# Patient Record
Sex: MALE | Race: WHITE | NOT HISPANIC OR LATINO | URBAN - METROPOLITAN AREA
[De-identification: names, ages, dates, MRNs, and addresses within clinical notes are randomized per-mention and may not be internally consistent; named-entity substitution may affect disease eponyms.]

---

## 2019-02-14 PROBLEM — 1755008 OLD MYOCARDIAL INFARCTION: Status: ACTIVE | Noted: 2019-02-14

## 2019-02-14 PROBLEM — 449681000124101 LONG-TERM CURRENT USE OF ANTIPLATELET DRUG: Status: ACTIVE | Noted: 2019-02-14

## 2022-04-30 ENCOUNTER — TELEPHONE ENCOUNTER (OUTPATIENT)
Dept: URBAN - METROPOLITAN AREA CLINIC 121 | Facility: CLINIC | Age: 72
End: 2022-04-30

## 2022-04-30 RX ORDER — METHOTREXATE SODIUM 1 G/1
INJECTION, POWDER, LYOPHILIZED, FOR SOLUTION INTRA-ARTERIAL; INTRAMUSCULAR; INTRATHECAL; INTRAVENOUS
OUTPATIENT
Start: 2011-04-29

## 2022-04-30 RX ORDER — ALBUTEROL SULFATE 90 UG/1
AEROSOL, METERED RESPIRATORY (INHALATION)
OUTPATIENT
Start: 2016-10-06 | End: 2019-02-14

## 2022-04-30 RX ORDER — RISEDRONATE SODIUM 129; 21 MG/1; MG/1
TABLET, FILM COATED ORAL
OUTPATIENT
Start: 2009-10-02

## 2022-04-30 RX ORDER — MESALAMINE 400 MG/1
2 PO TID CAPSULE, DELAYED RELEASE ORAL
OUTPATIENT
Start: 2006-03-15 | End: 2011-02-23

## 2022-04-30 RX ORDER — MESALAMINE 1.2 G/1
4 TABLETS PO QAM WITH FOOD TABLET, DELAYED RELEASE ORAL
OUTPATIENT
Start: 2017-05-01 | End: 2019-02-14

## 2022-04-30 RX ORDER — AZATHIOPRINE 50 MG/1
1 PO TID TABLET ORAL
OUTPATIENT
Start: 2007-03-20 | End: 2007-03-20

## 2022-04-30 RX ORDER — RISEDRONATE SODIUM 129; 21 MG/1; MG/1
TABLET, FILM COATED ORAL
OUTPATIENT
Start: 2009-10-02 | End: 2015-07-02

## 2022-04-30 RX ORDER — ALENDRONATE SODIUM AND CHOLECALCIFEROL 70; 2800 MG/1; [IU]/1
TABLET ORAL
OUTPATIENT
Start: 2007-03-20

## 2022-04-30 RX ORDER — MESALAMINE 1.2 G/1
4 TABLETS PO QD TABLET, DELAYED RELEASE ORAL
OUTPATIENT
Start: 2014-03-17 | End: 2014-12-09

## 2022-04-30 RX ORDER — MESALAMINE 1.2 G/1
4TABS QD TABLET, DELAYED RELEASE ORAL
OUTPATIENT
Start: 2011-06-28 | End: 2014-12-09

## 2022-04-30 RX ORDER — AZATHIOPRINE 50 MG/1
1 PO TID TABLET ORAL
OUTPATIENT
Start: 2006-03-15

## 2022-04-30 RX ORDER — MESALAMINE 400 MG/1
2 PO TID CAPSULE, DELAYED RELEASE ORAL
OUTPATIENT
Start: 2006-03-15

## 2022-04-30 RX ORDER — MESALAMINE 1.2 G/1
4 TABLETS PO QAM WITH FOOD TABLET, DELAYED RELEASE ORAL
OUTPATIENT
Start: 2017-05-01

## 2022-04-30 RX ORDER — MESALAMINE 1.2 G/1
TAKE 4 TABS PO QD TABLET, DELAYED RELEASE ORAL
OUTPATIENT
Start: 2013-07-03 | End: 2014-12-09

## 2022-04-30 RX ORDER — AZATHIOPRINE 50 MG/1
1 PO TID TABLET ORAL
OUTPATIENT
Start: 2006-03-15 | End: 2011-02-23

## 2022-04-30 RX ORDER — LATANOPROST/PF 0.005 %
DROPS OPHTHALMIC (EYE)
OUTPATIENT
Start: 2015-07-02

## 2022-04-30 RX ORDER — MESALAMINE 1.2 G/1
TAKE 4 TABS  PO QD TABLET, DELAYED RELEASE ORAL
OUTPATIENT
Start: 2013-12-02 | End: 2014-12-09

## 2022-04-30 RX ORDER — MESALAMINE 1.2 G/1
TAKE 4 TABS  PO QD TABLET, DELAYED RELEASE ORAL
OUTPATIENT
Start: 2013-12-02

## 2022-04-30 RX ORDER — MESALAMINE 1.2 G/1
TABLET, DELAYED RELEASE ORAL
OUTPATIENT
Start: 2011-04-29

## 2022-04-30 RX ORDER — MESALAMINE 400 MG/1
2 PO TID CAPSULE, DELAYED RELEASE ORAL
OUTPATIENT
Start: 2007-03-20 | End: 2007-03-20

## 2022-04-30 RX ORDER — MESALAMINE 1.2 G/1
TAKE 4 TABS PO QD TABLET, DELAYED RELEASE ORAL
OUTPATIENT
Start: 2013-07-03

## 2022-04-30 RX ORDER — MESALAMINE 1.2 G/1
4TABS QD TABLET, DELAYED RELEASE ORAL
OUTPATIENT
Start: 2011-06-28

## 2022-04-30 RX ORDER — PANTOPRAZOLE SODIUM 40 MG/1
TAKE ONE TABLET BY MOUTH ONCE DAILY TABLET, DELAYED RELEASE ORAL
OUTPATIENT
Start: 2014-09-23

## 2022-04-30 RX ORDER — LATANOPROST/PF 0.005 %
DROPS OPHTHALMIC (EYE)
OUTPATIENT
Start: 2015-07-02 | End: 2016-03-04

## 2022-04-30 RX ORDER — LEVOTHYROXINE SODIUM 150 MCG
TABLET ORAL
OUTPATIENT
Start: 2015-07-02

## 2022-04-30 RX ORDER — LEVOTHYROXINE SODIUM 150 MCG
TABLET ORAL
OUTPATIENT
Start: 2015-07-02 | End: 2016-03-04

## 2022-04-30 RX ORDER — TRAMADOL HYDROCHLORIDE 50 MG/1
TABLET, FILM COATED ORAL
OUTPATIENT
Start: 2007-03-20

## 2022-04-30 RX ORDER — MESALAMINE 1.2 G/1
TAKE FOUR TABLETS BY MOUTH ONCE DAILY TABLET, DELAYED RELEASE ORAL
OUTPATIENT
Start: 2014-04-17 | End: 2014-12-09

## 2022-04-30 RX ORDER — METHOTREXATE SODIUM 1 G/1
INJECTION, POWDER, LYOPHILIZED, FOR SOLUTION INTRA-ARTERIAL; INTRAMUSCULAR; INTRATHECAL; INTRAVENOUS
OUTPATIENT
Start: 2011-04-29 | End: 2013-06-24

## 2022-04-30 RX ORDER — MESALAMINE 1.2 G/1
4 TABLETS PO QD TABLET, DELAYED RELEASE ORAL
OUTPATIENT
Start: 2014-03-17

## 2022-04-30 RX ORDER — LEVOTHYROXINE SODIUM 88 MCG
TABLET ORAL
OUTPATIENT
Start: 2007-12-11

## 2022-04-30 RX ORDER — TRAMADOL HYDROCHLORIDE 50 MG/1
TABLET, FILM COATED ORAL
OUTPATIENT
Start: 2007-03-20 | End: 2011-04-29

## 2022-04-30 RX ORDER — MESALAMINE 1.2 G/1
TAKE FOUR TABLETS BY MOUTH ONCE DAILY TABLET, DELAYED RELEASE ORAL
OUTPATIENT
Start: 2014-04-17

## 2022-04-30 RX ORDER — MESALAMINE 1.2 G/1
TAKE FOUR TABLETS BY MOUTH ONCE DAILY TABLET, DELAYED RELEASE ORAL
OUTPATIENT
Start: 2013-10-29

## 2022-04-30 RX ORDER — ALBUTEROL SULFATE 90 UG/1
AEROSOL, METERED RESPIRATORY (INHALATION)
OUTPATIENT
Start: 2016-10-06

## 2022-04-30 RX ORDER — ALENDRONATE SODIUM AND CHOLECALCIFEROL 70; 2800 MG/1; [IU]/1
TABLET ORAL
OUTPATIENT
Start: 2007-03-20 | End: 2009-10-02

## 2022-04-30 RX ORDER — PANTOPRAZOLE SODIUM 40 MG/1
TAKE ONE TABLET BY MOUTH ONCE DAILY TABLET, DELAYED RELEASE ORAL
OUTPATIENT
Start: 2014-09-23 | End: 2015-07-02

## 2022-04-30 RX ORDER — MESALAMINE 1.2 G/1
TAKE FOUR TABLETS BY MOUTH ONCE DAILY TABLET, DELAYED RELEASE ORAL
OUTPATIENT
Start: 2013-10-29 | End: 2014-12-09

## 2022-05-01 ENCOUNTER — TELEPHONE ENCOUNTER (OUTPATIENT)
Dept: URBAN - METROPOLITAN AREA CLINIC 121 | Facility: CLINIC | Age: 72
End: 2022-05-01

## 2022-05-01 RX ORDER — NITROGLYCERIN 0.3 MG/1
TABLET SUBLINGUAL
Status: ACTIVE | COMMUNITY
Start: 2019-02-14

## 2022-05-01 RX ORDER — HYDROXYCHLOROQUINE SULFATE 200 MG/1
TABLET ORAL
Status: ACTIVE | COMMUNITY
Start: 2007-03-20

## 2022-05-01 RX ORDER — ATORVASTATIN CALCIUM 80 MG/1
TABLET, FILM COATED ORAL
Status: ACTIVE | COMMUNITY
Start: 2019-02-14

## 2022-05-01 RX ORDER — ASPIRIN 81 MG/1
TABLET, DELAYED RELEASE ORAL
Status: ACTIVE | COMMUNITY
Start: 2019-02-14

## 2022-05-01 RX ORDER — LATANOPROST/PF 0.005 %
DROPS OPHTHALMIC (EYE)
Status: ACTIVE | COMMUNITY
Start: 2019-02-14

## 2022-05-01 RX ORDER — MESALAMINE 1.2 G/1
TAKE FOUR TABLETS BY MOUTH ONCE DAILY TABLET, DELAYED RELEASE ORAL
Status: ACTIVE | COMMUNITY
Start: 2019-02-14

## 2022-05-01 RX ORDER — ACETAMINOPHEN 325 MG/1
TABLET, FILM COATED ORAL
Status: ACTIVE | COMMUNITY
Start: 2007-03-20

## 2022-05-01 RX ORDER — HYDROXYCHLOROQUINE SULFATE 200 MG/1
TABLET, FILM COATED ORAL
Status: ACTIVE | COMMUNITY
Start: 2017-08-24

## 2022-05-01 RX ORDER — CLOPIDOGREL 75 MG/1
TABLET ORAL
Status: ACTIVE | COMMUNITY
Start: 2019-02-14

## 2022-05-01 RX ORDER — ISOSORBIDE MONONITRATE 30 MG/1
TABLET, EXTENDED RELEASE ORAL
Status: ACTIVE | COMMUNITY
Start: 2019-02-14

## 2022-05-01 RX ORDER — MESALAMINE 1.2 G/1
TAKE FOUR TABLETS BY MOUTH ONCE DAILY TABLET, DELAYED RELEASE ORAL
Status: ACTIVE | COMMUNITY
Start: 2014-12-09

## 2022-05-01 RX ORDER — METFORMIN HCL 500 MG/1
TABLET ORAL
Status: ACTIVE | COMMUNITY
Start: 2013-06-24

## 2022-05-01 RX ORDER — CHOLECALCIFEROL (VITAMIN D3) 25 MCG
TABLET,CHEWABLE ORAL
Status: ACTIVE | COMMUNITY
Start: 2019-02-14

## 2022-05-17 ENCOUNTER — CLAIMS CREATED FROM THE CLAIM WINDOW (OUTPATIENT)
Dept: URBAN - METROPOLITAN AREA CLINIC 27 | Facility: CLINIC | Age: 72
End: 2022-05-17
Payer: COMMERCIAL

## 2022-05-17 ENCOUNTER — WEB ENCOUNTER (OUTPATIENT)
Dept: URBAN - METROPOLITAN AREA CLINIC 27 | Facility: CLINIC | Age: 72
End: 2022-05-17

## 2022-05-17 VITALS
HEART RATE: 87 BPM | DIASTOLIC BLOOD PRESSURE: 86 MMHG | SYSTOLIC BLOOD PRESSURE: 147 MMHG | BODY MASS INDEX: 19.55 KG/M2 | HEIGHT: 69 IN | WEIGHT: 132 LBS

## 2022-05-17 DIAGNOSIS — I10 HYPERTENSION: ICD-10-CM

## 2022-05-17 DIAGNOSIS — R19.8 IRREGULAR BOWEL HABITS: ICD-10-CM

## 2022-05-17 DIAGNOSIS — K51.90 ULCERATIVE COLITIS: ICD-10-CM

## 2022-05-17 DIAGNOSIS — K30 FUNCTIONAL DYSPEPSIA: ICD-10-CM

## 2022-05-17 DIAGNOSIS — K21.9 GERD: ICD-10-CM

## 2022-05-17 PROBLEM — 313436004 TYPE II DIABETES MELLITUS WITHOUT COMPLICATION: Status: ACTIVE | Noted: 2019-02-14

## 2022-05-17 PROCEDURE — 99214 OFFICE O/P EST MOD 30 MIN: CPT | Performed by: INTERNAL MEDICINE

## 2022-05-17 PROCEDURE — 99204 OFFICE O/P NEW MOD 45 MIN: CPT | Performed by: INTERNAL MEDICINE

## 2022-05-17 RX ORDER — METFORMIN HCL 500 MG/1
TABLET ORAL
Status: ACTIVE | COMMUNITY
Start: 2013-06-24

## 2022-05-17 RX ORDER — CHOLECALCIFEROL (VITAMIN D3) 25 MCG
TABLET,CHEWABLE ORAL
Status: ACTIVE | COMMUNITY
Start: 2019-02-14

## 2022-05-17 RX ORDER — ISOSORBIDE MONONITRATE 30 MG/1
TABLET, EXTENDED RELEASE ORAL
Status: ACTIVE | COMMUNITY
Start: 2019-02-14

## 2022-05-17 RX ORDER — ACETAMINOPHEN 325 MG/1
TABLET, FILM COATED ORAL
Status: ACTIVE | COMMUNITY
Start: 2007-03-20

## 2022-05-17 RX ORDER — ATORVASTATIN CALCIUM 80 MG/1
TABLET, FILM COATED ORAL
Status: ACTIVE | COMMUNITY
Start: 2019-02-14

## 2022-05-17 RX ORDER — HYDROXYCHLOROQUINE SULFATE 200 MG/1
TABLET ORAL
Status: ACTIVE | COMMUNITY
Start: 2007-03-20

## 2022-05-17 RX ORDER — LATANOPROST/PF 0.005 %
DROPS OPHTHALMIC (EYE)
Status: ACTIVE | COMMUNITY
Start: 2019-02-14

## 2022-05-17 RX ORDER — MESALAMINE 1.2 G/1
TAKE FOUR TABLETS BY MOUTH ONCE DAILY TABLET, DELAYED RELEASE ORAL
Status: ACTIVE | COMMUNITY
Start: 2014-12-09

## 2022-05-17 RX ORDER — MESALAMINE 1.2 G/1
TAKE FOUR TABLETS BY MOUTH ONCE DAILY TABLET, DELAYED RELEASE ORAL
Status: ACTIVE | COMMUNITY
Start: 2019-02-14

## 2022-05-17 RX ORDER — CLOPIDOGREL 75 MG/1
TABLET ORAL
Status: ACTIVE | COMMUNITY
Start: 2019-02-14

## 2022-05-17 RX ORDER — HYDROXYCHLOROQUINE SULFATE 200 MG/1
TABLET, FILM COATED ORAL
Status: ACTIVE | COMMUNITY
Start: 2017-08-24

## 2022-05-17 RX ORDER — NITROGLYCERIN 0.3 MG/1
TABLET SUBLINGUAL
Status: ACTIVE | COMMUNITY
Start: 2019-02-14

## 2022-05-17 NOTE — PHYSICAL EXAM GASTROINTESTINAL
Abdomen is soft, nontender, nondistended, no guarding or rigidity, no masses palpable, normal bowel sounds

## 2022-05-17 NOTE — HPI-TODAY'S VISIT:
Patient here for follow-up of ulcerative colitis >15y. He is currently doing mostly well from a GI standpoint. He still taking four mesalamine once daily. He has infrequent (ie once or twice a month) bowel irregularity (ie "soft stools) and indigestion which seem to be triggered by spicy foods. He denies any other specific food sensitivities. He does take a probiotic, though only PRN. He infrequently uses antacids. He has intermittent dyspeptic symptoms. He does not use any anti-gas medication. He has had significant stress lately. He still takes Plavix but is no longer on aspirin. His last colonoscopy was in 2015; no active colitis was noted endoscopically or microscopically. He underwent upper endoscopy in 2016 for chest pain; this was apparently unremarkable. There is no family history of colon cancer or polyps. He had an MI in late 2017 and subsequently underwent four vessel bypass grafting. He later underwent coronary artery stenting in April 2019. He states that recent labs were unremarkable. He is a diabetic and his most recent hemoglobin A1c was 8.1%.

## 2022-05-18 ENCOUNTER — DASHBOARD ENCOUNTERS (OUTPATIENT)
Age: 72
End: 2022-05-18

## 2022-05-18 ENCOUNTER — LAB OUTSIDE AN ENCOUNTER (OUTPATIENT)
Dept: URBAN - METROPOLITAN AREA CLINIC 27 | Facility: CLINIC | Age: 72
End: 2022-05-18

## 2022-05-18 PROBLEM — 449671000124104 LONG-TERM CURRENT USE OF ANTITHROMBOTIC: Status: ACTIVE | Noted: 2019-02-14

## 2022-05-18 PROBLEM — 3696007 FUNCTIONAL DYSPEPSIA: Status: ACTIVE | Noted: 2019-02-14

## 2022-05-18 PROBLEM — 73211009 DIABETES MELLITUS: Status: ACTIVE | Noted: 2022-05-18

## 2022-05-18 PROBLEM — 64766004 ULCERATIVE COLITIS: Status: ACTIVE | Noted: 2022-05-18

## 2022-05-18 PROBLEM — 40930008 HYPOTHYROIDISM: Status: ACTIVE | Noted: 2022-05-17

## 2022-05-18 PROBLEM — 38341003 HYPERTENSION: Status: ACTIVE | Noted: 2022-05-18

## 2022-05-18 PROBLEM — 40930008 HYPOTHYROIDISM: Status: ACTIVE | Noted: 2022-05-18

## 2022-05-18 PROBLEM — 235595009 GASTROESOPHAGEAL REFLUX DISEASE: Status: ACTIVE | Noted: 2022-05-18

## 2022-06-09 ENCOUNTER — ERX REFILL RESPONSE (OUTPATIENT)
Dept: URBAN - METROPOLITAN AREA CLINIC 27 | Facility: CLINIC | Age: 72
End: 2022-06-09

## 2022-06-09 RX ORDER — MESALAMINE 1.2 G/1
TAKE 4 TABLETS ONCE DAILY TABLET, DELAYED RELEASE ORAL
Qty: 360 TABLET | Refills: 4 | OUTPATIENT

## 2022-06-09 RX ORDER — MESALAMINE 1.2 G/1
TAKE FOUR TABLETS BY MOUTH ONCE DAILY TABLET, DELAYED RELEASE ORAL
OUTPATIENT

## 2022-06-10 ENCOUNTER — TELEPHONE ENCOUNTER (OUTPATIENT)
Dept: URBAN - METROPOLITAN AREA CLINIC 27 | Facility: CLINIC | Age: 72
End: 2022-06-10

## 2022-06-10 RX ORDER — MESALAMINE 1.2 G/1
TAKE 4 TABLETS TABLET, DELAYED RELEASE ORAL ONCE A DAY
Qty: 360 | Refills: 3

## 2023-03-06 ENCOUNTER — ERX REFILL RESPONSE (OUTPATIENT)
Dept: URBAN - METROPOLITAN AREA CLINIC 27 | Facility: CLINIC | Age: 73
End: 2023-03-06

## 2023-03-06 RX ORDER — MESALAMINE 1.2 G/1
TAKE 4 TABLETS ONCE DAILY TABLET, DELAYED RELEASE ORAL
Qty: 360 TABLET | Refills: 4 | OUTPATIENT

## 2023-03-06 RX ORDER — MESALAMINE 1.2 G/1
TAKE 4 TABLETS ONCE DAILY TABLET, DELAYED RELEASE ORAL
Qty: 360 TABLET | Refills: 3 | OUTPATIENT

## 2024-07-24 ENCOUNTER — LAB OUTSIDE AN ENCOUNTER (OUTPATIENT)
Dept: URBAN - METROPOLITAN AREA CLINIC 27 | Facility: CLINIC | Age: 74
End: 2024-07-24

## 2024-07-24 ENCOUNTER — OFFICE VISIT (OUTPATIENT)
Dept: URBAN - METROPOLITAN AREA CLINIC 27 | Facility: CLINIC | Age: 74
End: 2024-07-24
Payer: COMMERCIAL

## 2024-07-24 VITALS
WEIGHT: 112 LBS | BODY MASS INDEX: 16.59 KG/M2 | RESPIRATION RATE: 17 BRPM | HEART RATE: 55 BPM | HEIGHT: 69 IN | SYSTOLIC BLOOD PRESSURE: 116 MMHG | DIASTOLIC BLOOD PRESSURE: 71 MMHG

## 2024-07-24 DIAGNOSIS — E03.9 HYPOTHYROIDISM: ICD-10-CM

## 2024-07-24 DIAGNOSIS — K62.5 RECTAL BLEEDING: ICD-10-CM

## 2024-07-24 DIAGNOSIS — K51.90 ULCERATIVE COLITIS: ICD-10-CM

## 2024-07-24 DIAGNOSIS — K21.9 GERD: ICD-10-CM

## 2024-07-24 DIAGNOSIS — E11.9 DIABETES MELLITUS: ICD-10-CM

## 2024-07-24 DIAGNOSIS — I10 HYPERTENSION: ICD-10-CM

## 2024-07-24 DIAGNOSIS — R10.32 LEFT LOWER QUADRANT ABDOMINAL PAIN: ICD-10-CM

## 2024-07-24 DIAGNOSIS — R19.7 DIARRHEA: ICD-10-CM

## 2024-07-24 DIAGNOSIS — Z79.02 LONG TERM (CURRENT) USE OF ANTITHROMBOTICS/ANTIPLATELETS: ICD-10-CM

## 2024-07-24 PROCEDURE — 99204 OFFICE O/P NEW MOD 45 MIN: CPT | Performed by: INTERNAL MEDICINE

## 2024-07-24 RX ORDER — CHOLECALCIFEROL (VITAMIN D3) 25 MCG
TABLET,CHEWABLE ORAL
Status: DISCONTINUED | COMMUNITY
Start: 2019-02-14

## 2024-07-24 RX ORDER — HYDROXYCHLOROQUINE SULFATE 200 MG/1
TABLET ORAL
Status: DISCONTINUED | COMMUNITY
Start: 2007-03-20

## 2024-07-24 RX ORDER — ATORVASTATIN CALCIUM 80 MG/1
TABLET, FILM COATED ORAL
Status: ACTIVE | COMMUNITY
Start: 2019-02-14

## 2024-07-24 RX ORDER — MESALAMINE 1.2 G/1
TAKE 4 TABLETS ONCE DAILY TABLET, DELAYED RELEASE ORAL
Qty: 360 TABLET | Refills: 3 | Status: ACTIVE | COMMUNITY

## 2024-07-24 RX ORDER — LATANOPROST/PF 0.005 %
DROPS OPHTHALMIC (EYE)
Status: DISCONTINUED | COMMUNITY
Start: 2019-02-14

## 2024-07-24 RX ORDER — NITROGLYCERIN 0.3 MG/1
TABLET SUBLINGUAL
Status: ACTIVE | COMMUNITY
Start: 2019-02-14

## 2024-07-24 RX ORDER — ACETAMINOPHEN 325 MG/1
TABLET, FILM COATED ORAL
Status: DISCONTINUED | COMMUNITY
Start: 2007-03-20

## 2024-07-24 RX ORDER — ISOSORBIDE MONONITRATE 30 MG/1
TABLET, EXTENDED RELEASE ORAL
Status: DISCONTINUED | COMMUNITY
Start: 2019-02-14

## 2024-07-24 RX ORDER — METFORMIN HCL 500 MG/1
TABLET ORAL
Status: ACTIVE | COMMUNITY
Start: 2013-06-24

## 2024-07-24 RX ORDER — HYDROXYCHLOROQUINE SULFATE 200 MG/1
TABLET, FILM COATED ORAL
Status: ACTIVE | COMMUNITY
Start: 2017-08-24

## 2024-07-24 RX ORDER — CLOPIDOGREL 75 MG/1
TABLET ORAL
Status: ACTIVE | COMMUNITY
Start: 2019-02-14

## 2024-07-24 RX ORDER — MESALAMINE 1.2 G/1
TAKE FOUR TABLETS BY MOUTH ONCE DAILY TABLET, DELAYED RELEASE ORAL
Status: ACTIVE | COMMUNITY
Start: 2014-12-09

## 2024-07-24 NOTE — HPI-TODAY'S VISIT:
Patient with history of ulcerative colitis >17y here with complaints of rectal bleeding and diarrhea that have been present for at least the past week. The rectal bleeding is bright red in color and noted both on the toilet paper and in the toilet water. It is sometimes ?voluminous. He currently has 3-4 stools per day that are usually watery/soft. They are not nocturnal. He does not use any antidiarrheals but does take a probiotic. He also has intermittent left lower quadrant pain which is often worse after a meal. He does not take anything for this. He has not lost any significant weight recently. He has not been on any antibiotics/new medications recently, and denies any trigger for the worsening symptoms. He is still taking 4 mesalamine once daily.   He still takes Plavix but is no longer on aspirin. His last colonoscopy was in 2015; no active colitis was noted endoscopically or microscopically. He underwent upper endoscopy in 2016 for chest pain; this was apparently unremarkable. There is no family history of colon cancer or polyps. He had an MI in late 2017 and subsequently underwent four vessel bypass grafting. He later underwent coronary artery stenting in April 2019.

## 2024-07-25 ENCOUNTER — LAB OUTSIDE AN ENCOUNTER (OUTPATIENT)
Dept: URBAN - METROPOLITAN AREA CLINIC 27 | Facility: CLINIC | Age: 74
End: 2024-07-25

## 2024-07-25 LAB
ABSOLUTE BASOPHILS: 67
ABSOLUTE EOSINOPHILS: 509
ABSOLUTE LYMPHOCYTES: 2160
ABSOLUTE MONOCYTES: 864
ABSOLUTE NEUTROPHILS: 6000
AMYLASE: 24
BASOPHILS: 0.7
C-REACTIVE PROTEIN, QUANT: 5
EOSINOPHILS: 5.3
HEMATOCRIT: 40.1
HEMOGLOBIN: 13.5
LIPASE: 12
LYMPHOCYTES: 22.5
MCH: 30.6
MCHC: 33.7
MCV: 90.9
MONOCYTES: 9
MPV: 11.5
NEUTROPHILS: 62.5
PLATELET COUNT: 193
RDW: 12.7
RED BLOOD CELL COUNT: 4.41
SED RATE BY MODIFIED: 17
TSH: 0.45
WHITE BLOOD CELL COUNT: 9.6

## 2024-08-01 ENCOUNTER — TELEPHONE ENCOUNTER (OUTPATIENT)
Dept: URBAN - METROPOLITAN AREA CLINIC 27 | Facility: CLINIC | Age: 74
End: 2024-08-01

## 2024-08-06 ENCOUNTER — TELEPHONE ENCOUNTER (OUTPATIENT)
Dept: URBAN - METROPOLITAN AREA CLINIC 27 | Facility: CLINIC | Age: 74
End: 2024-08-06

## 2024-08-07 ENCOUNTER — TELEPHONE ENCOUNTER (OUTPATIENT)
Dept: URBAN - METROPOLITAN AREA CLINIC 27 | Facility: CLINIC | Age: 74
End: 2024-08-07

## 2024-08-07 RX ORDER — POLYETHYLENE GLYCOL 3350, SODIUM SULFATE ANHYDROUS, SODIUM BICARBONATE, SODIUM CHLORIDE, POTASSIUM CHLORIDE 236; 22.74; 6.74; 5.86; 2.97 G/4L; G/4L; G/4L; G/4L; G/4L
4000ML POWDER, FOR SOLUTION ORAL ONCE
Qty: 4000 MILLILITER | Refills: 0 | OUTPATIENT
Start: 2024-08-07 | End: 2024-08-08

## 2024-08-13 ENCOUNTER — TELEPHONE ENCOUNTER (OUTPATIENT)
Dept: URBAN - METROPOLITAN AREA CLINIC 25 | Facility: CLINIC | Age: 74
End: 2024-08-13

## 2024-08-13 ENCOUNTER — OFFICE VISIT (OUTPATIENT)
Dept: URBAN - METROPOLITAN AREA MEDICAL CENTER 8 | Facility: MEDICAL CENTER | Age: 74
End: 2024-08-13
Payer: COMMERCIAL

## 2024-08-13 DIAGNOSIS — K63.5 BENIGN COLON POLYP: ICD-10-CM

## 2024-08-13 DIAGNOSIS — K51.511 LEFT SIDED COLITIS WITH RECTAL BLEEDING: ICD-10-CM

## 2024-08-13 PROCEDURE — 45380 COLONOSCOPY AND BIOPSY: CPT | Performed by: INTERNAL MEDICINE

## 2024-08-13 RX ORDER — MESALAMINE 1.2 G/1
TAKE 4 TABLETS ONCE DAILY TABLET, DELAYED RELEASE ORAL
Qty: 360 TABLET | Refills: 3 | Status: ACTIVE | COMMUNITY

## 2024-08-13 RX ORDER — ATORVASTATIN CALCIUM 80 MG/1
TABLET, FILM COATED ORAL
Status: ACTIVE | COMMUNITY
Start: 2019-02-14

## 2024-08-13 RX ORDER — MESALAMINE 1.2 G/1
TAKE FOUR TABLETS BY MOUTH ONCE DAILY TABLET, DELAYED RELEASE ORAL
Status: ACTIVE | COMMUNITY
Start: 2014-12-09

## 2024-08-13 RX ORDER — HYDROCORTISONE 100 MG/60ML
60 ML IN THE EVENING SUSPENSION RECTAL ONCE A DAY
Qty: 1800 ML | Refills: 3 | OUTPATIENT
Start: 2024-08-13

## 2024-08-13 RX ORDER — CLOPIDOGREL 75 MG/1
TABLET ORAL
Status: ACTIVE | COMMUNITY
Start: 2019-02-14

## 2024-08-13 RX ORDER — METFORMIN HCL 500 MG/1
TABLET ORAL
Status: ACTIVE | COMMUNITY
Start: 2013-06-24

## 2024-08-13 RX ORDER — NITROGLYCERIN 0.3 MG/1
TABLET SUBLINGUAL
Status: ACTIVE | COMMUNITY
Start: 2019-02-14

## 2024-08-13 RX ORDER — HYDROXYCHLOROQUINE SULFATE 200 MG/1
TABLET, FILM COATED ORAL
Status: ACTIVE | COMMUNITY
Start: 2017-08-24

## 2024-09-05 ENCOUNTER — WEB ENCOUNTER (OUTPATIENT)
Dept: URBAN - METROPOLITAN AREA CLINIC 27 | Facility: CLINIC | Age: 74
End: 2024-09-05

## 2024-09-05 RX ORDER — MESALAMINE 4 G/60ML
TAKE ONE ENEMA SUSPENSION RECTAL NIGHTLY
Qty: 30 | Refills: 1 | OUTPATIENT
Start: 2024-09-06 | End: 2024-11-04

## 2024-09-05 RX ORDER — PREDNISONE 10 MG/1
TAKE 4 TABS PO X 1 WEEK, TAKE 3 TABS PO X 1 WEEK, TAKE 2 TABS PO X 1 WEEK, TAKE 1 TAB PO X 1 WEEK TABLET ORAL ONCE A DAY
Qty: 70 | Refills: 0 | OUTPATIENT
Start: 2024-09-06 | End: 2024-10-06

## 2024-09-11 ENCOUNTER — OFFICE VISIT (OUTPATIENT)
Dept: URBAN - METROPOLITAN AREA CLINIC 27 | Facility: CLINIC | Age: 74
End: 2024-09-11

## 2024-09-16 ENCOUNTER — WEB ENCOUNTER (OUTPATIENT)
Dept: URBAN - METROPOLITAN AREA CLINIC 27 | Facility: CLINIC | Age: 74
End: 2024-09-16

## 2024-09-17 ENCOUNTER — WEB ENCOUNTER (OUTPATIENT)
Dept: URBAN - METROPOLITAN AREA CLINIC 27 | Facility: CLINIC | Age: 74
End: 2024-09-17

## 2024-09-17 ENCOUNTER — TELEPHONE ENCOUNTER (OUTPATIENT)
Dept: URBAN - METROPOLITAN AREA CLINIC 27 | Facility: CLINIC | Age: 74
End: 2024-09-17

## 2024-09-17 RX ORDER — MESALAMINE 4 G/60ML
TAKE ONE ENEMA SUSPENSION RECTAL NIGHTLY
Qty: 30 | Refills: 1
Start: 2024-09-06 | End: 2024-11-15

## 2024-09-24 ENCOUNTER — WEB ENCOUNTER (OUTPATIENT)
Dept: URBAN - METROPOLITAN AREA CLINIC 27 | Facility: CLINIC | Age: 74
End: 2024-09-24

## 2024-09-24 ENCOUNTER — TELEPHONE ENCOUNTER (OUTPATIENT)
Dept: URBAN - METROPOLITAN AREA CLINIC 27 | Facility: CLINIC | Age: 74
End: 2024-09-24

## 2024-09-25 ENCOUNTER — OFFICE VISIT (OUTPATIENT)
Dept: URBAN - METROPOLITAN AREA CLINIC 27 | Facility: CLINIC | Age: 74
End: 2024-09-25

## 2024-10-02 ENCOUNTER — WEB ENCOUNTER (OUTPATIENT)
Dept: URBAN - METROPOLITAN AREA CLINIC 27 | Facility: CLINIC | Age: 74
End: 2024-10-02

## 2024-10-08 ENCOUNTER — TELEPHONE ENCOUNTER (OUTPATIENT)
Dept: URBAN - METROPOLITAN AREA CLINIC 27 | Facility: CLINIC | Age: 74
End: 2024-10-08

## 2024-10-16 ENCOUNTER — OFFICE VISIT (OUTPATIENT)
Dept: URBAN - METROPOLITAN AREA CLINIC 27 | Facility: CLINIC | Age: 74
End: 2024-10-16

## 2024-10-23 ENCOUNTER — OFFICE VISIT (OUTPATIENT)
Dept: URBAN - METROPOLITAN AREA CLINIC 27 | Facility: CLINIC | Age: 74
End: 2024-10-23

## 2024-11-01 ENCOUNTER — CLAIMS CREATED FROM THE CLAIM WINDOW (OUTPATIENT)
Dept: URBAN - METROPOLITAN AREA MEDICAL CENTER 8 | Facility: MEDICAL CENTER | Age: 74
End: 2024-11-01
Payer: COMMERCIAL

## 2024-11-01 DIAGNOSIS — K51.80 OTHER ULCERATIVE COLITIS: ICD-10-CM

## 2024-11-01 DIAGNOSIS — K94.19 OTHER COMPLICATIONS OF ENTEROSTOMY: ICD-10-CM

## 2024-11-01 PROCEDURE — 99254 IP/OBS CNSLTJ NEW/EST MOD 60: CPT | Performed by: INTERNAL MEDICINE

## 2024-12-05 ENCOUNTER — TELEPHONE ENCOUNTER (OUTPATIENT)
Dept: URBAN - METROPOLITAN AREA CLINIC 27 | Facility: CLINIC | Age: 74
End: 2024-12-05

## 2024-12-05 ENCOUNTER — OFFICE VISIT (OUTPATIENT)
Dept: URBAN - METROPOLITAN AREA CLINIC 27 | Facility: CLINIC | Age: 74
End: 2024-12-05
Payer: COMMERCIAL

## 2024-12-05 VITALS
BODY MASS INDEX: 13.92 KG/M2 | DIASTOLIC BLOOD PRESSURE: 62 MMHG | HEIGHT: 69 IN | SYSTOLIC BLOOD PRESSURE: 103 MMHG | HEART RATE: 86 BPM | WEIGHT: 94 LBS

## 2024-12-05 DIAGNOSIS — E46 MALNUTRITION: ICD-10-CM

## 2024-12-05 DIAGNOSIS — R10.817 GENERALIZED ABDOMINAL TENDERNESS: ICD-10-CM

## 2024-12-05 DIAGNOSIS — K51.90 ULCERATIVE COLITIS: ICD-10-CM

## 2024-12-05 DIAGNOSIS — R19.8 HIGH OUTPUT ILEOSTOMY: ICD-10-CM

## 2024-12-05 DIAGNOSIS — Z43.2 ILEOSTOMY CARE: ICD-10-CM

## 2024-12-05 DIAGNOSIS — R13.10 DYSPHAGIA: ICD-10-CM

## 2024-12-05 DIAGNOSIS — K21.9 GERD: ICD-10-CM

## 2024-12-05 DIAGNOSIS — R14.2 ERUCTATION: ICD-10-CM

## 2024-12-05 DIAGNOSIS — R68.81 EARLY SATIETY: ICD-10-CM

## 2024-12-05 DIAGNOSIS — R63.4 WEIGHT LOSS: ICD-10-CM

## 2024-12-05 PROCEDURE — 99214 OFFICE O/P EST MOD 30 MIN: CPT | Performed by: INTERNAL MEDICINE

## 2024-12-05 RX ORDER — MESALAMINE 1000 MG/1
1 SUPPOSITORY AT BEDTIME SUPPOSITORY RECTAL ONCE A DAY
Qty: 30 | Refills: 5 | OUTPATIENT
Start: 2024-12-05 | End: 2025-06-03

## 2024-12-05 RX ORDER — MESALAMINE 1.2 G/1
TAKE 4 TABLETS ONCE DAILY TABLET, DELAYED RELEASE ORAL
Qty: 360 TABLET | Refills: 3 | Status: ON HOLD | COMMUNITY

## 2024-12-05 RX ORDER — NITROGLYCERIN 0.3 MG/1
TABLET SUBLINGUAL
Status: ACTIVE | COMMUNITY
Start: 2019-02-14

## 2024-12-05 RX ORDER — CLOPIDOGREL 75 MG/1
TABLET ORAL
Status: ON HOLD | COMMUNITY
Start: 2019-02-14

## 2024-12-05 RX ORDER — ATORVASTATIN CALCIUM 80 MG/1
TABLET, FILM COATED ORAL
Status: ACTIVE | COMMUNITY
Start: 2019-02-14

## 2024-12-05 RX ORDER — HYDROCORTISONE 100 MG/60ML
60 ML IN THE EVENING SUSPENSION RECTAL ONCE A DAY
Qty: 1800 ML | Refills: 3 | Status: ON HOLD | COMMUNITY
Start: 2024-08-13

## 2024-12-05 RX ORDER — MESALAMINE 1.2 G/1
TAKE FOUR TABLETS BY MOUTH ONCE DAILY TABLET, DELAYED RELEASE ORAL
Status: ON HOLD | COMMUNITY
Start: 2014-12-09

## 2024-12-05 RX ORDER — METFORMIN HCL 500 MG/1
TABLET ORAL
Status: ACTIVE | COMMUNITY
Start: 2013-06-24

## 2024-12-05 RX ORDER — CHOLESTYRAMINE 4 G/9G
1 SCOOP MIXED IN WATER OR JUICE POWDER, FOR SUSPENSION ORAL THREE TIMES A DAY
Qty: 1 UNSPECIFIED | Refills: 3 | OUTPATIENT
Start: 2024-12-05

## 2024-12-05 RX ORDER — HYDROXYCHLOROQUINE SULFATE 200 MG/1
TABLET, FILM COATED ORAL
Status: ACTIVE | COMMUNITY
Start: 2017-08-24

## 2024-12-05 NOTE — HPI-TODAY'S VISIT:
Patient with history of ulcerative colitis >17y here for GI followup. He has had a very complicated recent past medical history. He was hospitalized in late September with persistent bloody diarrhea and weight loss. He was started on IV steroids without significant improvement in his symptoms. He underwent flex sig in early October which showed severe colitis. He was continued on IV steroids and also placed on Remicade. His symptoms did not improve and he had worsening leukocytosis, CRP and abdominal pain. A CT on October 6 was concerning for toxic megacolon. Colorectal surgery was consulted. He developed acute worsening abdominal pain and tachycardia that same evening. 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 His hospital course was complicated by tachyarrhythmia, persistent atrial flutter requiring cardioversion, aspiration pneumonia requiring antibiotics, anemia requiring transfusion. He was transferred to the rehab unit at [facility] and spent a month there receiving PT/OT, etc. He is here in follow-up. He has not had any blood per the ostomy nor has he had any rectal bleeding. He is not currently using a mesalamine suppository though this was given to him during the recent hospital stay. He has had problems with his ostomy output, and will sometimes have nearly a liter of output in a 24-hour period; he has been using Imodium 3 times daily. The ostomy output is worse overnight and early morning. His wife has also had difficulty with the ostomy bag. Apparently it has been leaking, and she has been changing the bag on a daily basis. He has not had any recent follow-up with an ostomy nurse, and a recent one-time visit with a dietitian was apparently unhelpful. He has had persistent early satiety and is only able to eat a few bites of each meal without getting full. He has persistent eructation and upper abdominal "pressure". He also complains of intermittent crampy abdominal pain. He denies significant reflux with pantoprazole 40 mg once daily. He has occasional dysphagia to pills but not solids or liquids. He has not been able to gain any weight. He remains very frail and debilitated. He had an OV with Dr. Chapman (colorectal surgery) around [facility]; she is not planning a proctectomy at this time, but is tentatively planning on a flex sig with surveillance of the rectal stump at some point. He is not currently taking anything for the ulcerative colitis. He has not had any labs since November 11, at which time his white count was 17.5K, hemoglobin 10.9, and normal LFTs aside from an albumin of 1.8.   His last colonoscopy was in 2015; no active colitis was noted endoscopically or microscopically. He underwent upper endoscopy in 2016 for chest pain; this was apparently unremarkable. There is no family history of colon cancer or polyps. He had an MI in late 2017 and subsequently underwent four vessel bypass grafting. He later underwent coronary artery stenting in April 2019.

## 2024-12-05 NOTE — PHYSICAL EXAM GASTROINTESTINAL
Abdomen is soft, nontender, nondistended, no guarding or rigidity, no masses palpable, normal bowel sounds; RLQ ileostomy with poorly-fitting bag/appliance, with significant stool leakage